# Patient Record
Sex: FEMALE | Race: OTHER
[De-identification: names, ages, dates, MRNs, and addresses within clinical notes are randomized per-mention and may not be internally consistent; named-entity substitution may affect disease eponyms.]

---

## 2018-03-04 ENCOUNTER — HOSPITAL ENCOUNTER (EMERGENCY)
Dept: HOSPITAL 17 - NEPD | Age: 28
Discharge: HOME | End: 2018-03-04
Payer: MEDICARE

## 2018-03-04 VITALS
RESPIRATION RATE: 18 BRPM | SYSTOLIC BLOOD PRESSURE: 157 MMHG | DIASTOLIC BLOOD PRESSURE: 83 MMHG | TEMPERATURE: 98.1 F | HEART RATE: 76 BPM | OXYGEN SATURATION: 99 %

## 2018-03-04 VITALS — HEIGHT: 68 IN | BODY MASS INDEX: 16.71 KG/M2 | WEIGHT: 110.23 LBS

## 2018-03-04 DIAGNOSIS — F31.9: ICD-10-CM

## 2018-03-04 DIAGNOSIS — Z72.0: ICD-10-CM

## 2018-03-04 DIAGNOSIS — F19.10: Primary | ICD-10-CM

## 2018-03-04 PROCEDURE — 99283 EMERGENCY DEPT VISIT LOW MDM: CPT

## 2018-03-04 NOTE — PD
HPI


Chief Complaint:  Psychiatric Symptoms


Time Seen by Provider:  19:12


Travel History


International Travel<30 days:  No


Contact w/Intl Traveler<30days:  No


Traveled to known affect area:  No





History of Present Illness


HPI


28-year-old female presents emergency department accompanied by her friend for 

psychological evaluation.  The patient has a history of IV drug abuse, crack 

cocaine abuse, hepatitis C, bipolar with depression.  She had come down from 

[place] to become sober.  She has been living with a friend here in the area 

now since January.  She is going to the methadone clinic.  She is on 100 mg of 

methadone daily.  She states that she has not done her when since she has come 

down to [place].  But she does continue to smoke crack cocaine.  She last used 

just prior to coming in.  The patient's friend states that she is becoming more 

paranoid and delusional.  She has not been sleeping.  The patient has no 

suicidal homicidal ideation.  She denies any medical complaints other than 

insomnia.





PFSH


Past Medical History


*** Narrative Medical


IV drug abuse, crack cocaine, hepatitis C, bipolar with depression, chronic 

back pain


Bipolar Disorder:  Yes


Depression:  Yes


Tetanus Vaccination:  Unknown


Influenza Vaccination:  No


Pregnant?:  Not Pregnant


LMP:  current





Past Surgical History


*** Narrative Surgical


Lumbar laminectomy with discectomy


Neurologic Surgery:  Yes (back)





Social History


Alcohol Use:  No


Tobacco Use:  Yes


Substance Use:  Yes (crack)





Allergies-Medications


(Allergen,Severity, Reaction):  


Coded Allergies:  


     Penicillins (Verified  Allergy, Unknown, 3/4/18)


     amoxicillin (Verified  Allergy, Unknown, 3/4/18)


Reported Meds & Prescriptions





Reported Meds & Active Scripts


Active


Reported


Methadone (Methadone HCl) 40 Mg Tab 100 Mg PO DAILY








Review of Systems


Except as stated in HPI:  all other systems reviewed are Neg





Physical Exam


Narrative


GENERAL: Well-nourished, well-developed patient.


SKIN: Warm and dry.


HEAD: Normocephalic and atraumatic.


EYES: No scleral icterus. No injection or drainage. 


ENT: No nasal drainage noted. Mucous membranes pink. Airway patent.


NECK: Supple, trachea midline.  Moves head freely without obvious discomfort.


CARDIOVASCULAR: Regular rate and rhythm without murmurs, gallops, or rubs. 


RESPIRATORY: Breath sounds equal bilaterally. No accessory muscle use.


GASTROINTESTINAL: Abdomen soft, non-tender, nondistended. 


EXTREMITIES: No cyanosis or edema.


BACK: Nontender without obvious deformity. No CVA tenderness.


NEURO: Patient is alert and oriented. no sensorimotor deficits.  Nonfocal.  

Normal speech.


PSYCH: No delusions.  Patient does admit to having auditory visual 

hallucinations.





Data


Data


Last Documented VS





Vital Signs








  Date Time  Temp Pulse Resp B/P (MAP) Pulse Ox O2 Delivery O2 Flow Rate FiO2


 


3/4/18 18:54   17     


 


3/4/18 18:42 98.1 76  157/83 (107) 99   








Orders





 Orders


Psych Screen (3/4/18 19:23)








MDM


Medical Decision Making


Medical Screen Exam Complete:  Yes


Emergency Medical Condition:  Yes


Medical Record Reviewed:  Yes


Differential Diagnosis


MDM: High


Differential diagnoses: Schizophrenia, schizoaffective disorder, bipolar, 

anxiety, depression, adjustment reaction, mood disorder NOS, ODD, depressive 

disorder NOS, dementia, dementia with agitation, psychosis NOS, substance 

induced mood disorder, DMDD, Asperger syndrome, infection,electrolyte 

abnormality, malingering.


Narrative Course


Mental health screening discussed with the patient.  Psychiatric screen ordered.





The patient has been evaluated by the psych screener.  She is given outpatient 

treatment information.  It is not felt that the patient warrants inpatient 

treatment.  She is not a threat to herself or others.  She does admit to 

substance abuse.  She does also follow up with substance abuse clinic on 

methadone.  The patient is advised to follow-up with [facility] in the 

morning.  I also agree that the patient is not a threat to herself or others.  

There is no indication for involuntary admission/may correct.





Diagnosis





 Primary Impression:  


 Substance abuse


 Additional Impression:  


 Bipolar


Referrals:  


[facility]


1 day


Patient Instructions:  General Instructions





***Additional Instructions:  


Rest.


Follow-up with the recommendations of the psych screener.


Avoid alcohol and drugs.


Return to the ER for emergencies.


Disposition:  01 DISCHARGE HOME


Condition:  Stable











Abner Keane Mar 4, 2018 19:30